# Patient Record
Sex: FEMALE | Race: WHITE | NOT HISPANIC OR LATINO | Employment: PART TIME | URBAN - METROPOLITAN AREA
[De-identification: names, ages, dates, MRNs, and addresses within clinical notes are randomized per-mention and may not be internally consistent; named-entity substitution may affect disease eponyms.]

---

## 2018-08-13 ENCOUNTER — HOSPITAL ENCOUNTER (OUTPATIENT)
Dept: MRI IMAGING | Facility: CLINIC | Age: 38
End: 2018-08-13
Attending: ORTHOPAEDIC SURGERY
Payer: COMMERCIAL

## 2018-08-13 ENCOUNTER — HOSPITAL ENCOUNTER (OUTPATIENT)
Dept: GENERAL RADIOLOGY | Facility: CLINIC | Age: 38
Discharge: HOME | End: 2018-08-13
Attending: ORTHOPAEDIC SURGERY | Admitting: ORTHOPAEDIC SURGERY
Payer: COMMERCIAL

## 2018-08-13 DIAGNOSIS — M25.551 HIP PAIN, RIGHT: ICD-10-CM

## 2018-08-13 PROCEDURE — 25000125 ZZHC RX 250: Performed by: RADIOLOGY

## 2018-08-13 PROCEDURE — 25000128 H RX IP 250 OP 636: Performed by: RADIOLOGY

## 2018-08-13 PROCEDURE — 27210995 ZZH RX 272: Performed by: RADIOLOGY

## 2018-08-13 PROCEDURE — A9579 GAD-BASE MR CONTRAST NOS,1ML: HCPCS | Performed by: RADIOLOGY

## 2018-08-13 PROCEDURE — 73722 MRI JOINT OF LWR EXTR W/DYE: CPT | Mod: RT

## 2018-08-13 PROCEDURE — 25500064 ZZH RX 255 OP 636: Performed by: RADIOLOGY

## 2018-08-13 PROCEDURE — 27093 INJECTION FOR HIP X-RAY: CPT

## 2018-08-13 RX ORDER — TRIAMCINOLONE ACETONIDE 40 MG/ML
80 INJECTION, SUSPENSION INTRA-ARTICULAR; INTRAMUSCULAR ONCE
Status: COMPLETED | OUTPATIENT
Start: 2018-08-13 | End: 2018-08-13

## 2018-08-13 RX ORDER — ACYCLOVIR 200 MG/1
10 CAPSULE ORAL ONCE
Status: COMPLETED | OUTPATIENT
Start: 2018-08-13 | End: 2018-08-13

## 2018-08-13 RX ORDER — IOPAMIDOL 408 MG/ML
10 INJECTION, SOLUTION INTRATHECAL ONCE
Status: COMPLETED | OUTPATIENT
Start: 2018-08-13 | End: 2018-08-13

## 2018-08-13 RX ORDER — EPINEPHRINE 1 MG/ML
1 INJECTION, SOLUTION, CONCENTRATE INTRAVENOUS ONCE
Status: COMPLETED | OUTPATIENT
Start: 2018-08-13 | End: 2018-08-13

## 2018-08-13 RX ADMIN — LIDOCAINE HYDROCHLORIDE 5 ML: 10 INJECTION, SOLUTION EPIDURAL; INFILTRATION; INTRACAUDAL; PERINEURAL at 13:05

## 2018-08-13 RX ADMIN — TRIAMCINOLONE ACETONIDE 80 MG: 40 INJECTION, SUSPENSION INTRA-ARTICULAR; INTRAMUSCULAR at 13:05

## 2018-08-13 RX ADMIN — GADOPENTETATE DIMEGLUMINE 0.05 ML: 469.01 INJECTION INTRAVENOUS at 13:05

## 2018-08-13 RX ADMIN — SODIUM CHLORIDE 10 ML: 9 INJECTION, SOLUTION INTRAMUSCULAR; INTRAVENOUS; SUBCUTANEOUS at 13:05

## 2018-08-13 RX ADMIN — IOPAMIDOL 5 ML: 408 INJECTION, SOLUTION INTRATHECAL at 13:33

## 2018-08-13 RX ADMIN — EPINEPHRINE 0.05 MG: 1 INJECTION, SOLUTION, CONCENTRATE INTRAVENOUS at 13:05

## 2018-08-13 NOTE — PROGRESS NOTES
RADIOLOGY PROCEDURE NOTE  Patient name: Neeru Scanlon  MRN: [id number]  : 1980    Pre-procedure diagnosis: Pain.  Post-procedure diagnosis: Same    Procedure Date/Time: 2018  1:29 PM  Procedure: Right hip intraarticular JAYCOB/steroid injection for MRI to follow.  Estimated blood loss: None  Specimen(s) collected:  None.  The patient tolerated the procedure well with no immediate complications.    See imaging dictation for procedural details.    Provider name: Uriah Heredia  Assistant(s):None

## 2020-01-24 ENCOUNTER — HOSPITAL ENCOUNTER (OUTPATIENT)
Dept: MRI IMAGING | Facility: CLINIC | Age: 40
Discharge: HOME | End: 2020-01-24
Attending: PODIATRIST | Admitting: PODIATRIST
Payer: COMMERCIAL

## 2020-01-24 DIAGNOSIS — M25.373 ANKLE INSTABILITY: ICD-10-CM

## 2020-01-24 PROCEDURE — 73721 MRI JNT OF LWR EXTRE W/O DYE: CPT | Mod: RT

## 2020-07-08 ENCOUNTER — HOSPITAL ENCOUNTER (OUTPATIENT)
Dept: PHYSICAL THERAPY | Facility: CLINIC | Age: 40
Setting detail: THERAPIES SERIES
End: 2020-07-08
Attending: PODIATRIST
Payer: COMMERCIAL

## 2020-07-08 PROCEDURE — 97110 THERAPEUTIC EXERCISES: CPT | Mod: GP | Performed by: PHYSICAL THERAPIST

## 2020-07-08 PROCEDURE — 97530 THERAPEUTIC ACTIVITIES: CPT | Mod: GP | Performed by: PHYSICAL THERAPIST

## 2020-07-08 PROCEDURE — 97162 PT EVAL MOD COMPLEX 30 MIN: CPT | Mod: GP,59 | Performed by: PHYSICAL THERAPIST
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
## 2020-07-08 NOTE — PROGRESS NOTES
[facility]    OUTPATIENT PHYSICAL THERAPY ORTHOPEDIC EVALUATION  PLAN OF TREATMENT FOR OUTPATIENT REHABILITATION  (COMPLETE FOR INITIAL CLAIMS ONLY)  Patient's Last Name, First Name, M.I.  YOB: 1980  CapoNeeru  MATTHEW    Provider s Name:  [facility]   Medical Record No.  [id number]   Start of Care Date:  07/08/20   Onset Date:  05/13/20(Surgical Date. )   Type:     _X__PT   ___OT   ___SLP Medical Diagnosis:  R Post op Tendon repair, ligamentous repair, retinaculum repair      PT Diagnosis:  Limited mobility d/t recent ankle surgery for tendon repair on 5/13/20.    Visits from SOC:  1      _________________________________________________________________________________  Plan of Treatment/Functional Goals:     Desensitization, MT, DEvelop HEP, PTRX# [id number]     US, Iontophoresis per order.   Goals  Goal Identifier: 1  Goal Description: STG: Pt will take stairs normally and minimal difficulty.   Target Date: 08/10/20    Goal Identifier: 2  Goal Description: STG: Pt will be able to walk 2 blocks w/ moderate difficulty.   Target Date: 08/10/20    Goal Identifier: 3  Goal Description: STG: Pt will be able tos it for an hour w/ moderate difficulty.   Target Date: 08/10/20    Goal Identifier: 4  Goal Description: STG: Pt will be floyd to get in/out of car w/ minimal difficulty.   Target Date: 08/24/20    Goal Identifier: 5  Goal Description: LTG: Pt will be independnet w/ HEP and RTW.   Target Date: 10/09/20    Therapy Frequency:  2 times/Week  Predicted Duration of Therapy Intervention:  6 weeks, 12 visits.     Carole Valdez, PT                 I CERTIFY THE NEED FOR THESE SERVICES FURNISHED UNDER        THIS PLAN OF TREATMENT AND WHILE UNDER MY CARE     (Physician co-signature of this document indicates review and certification of the therapy plan).                     Certification Date From:  07/08/20   Certification Date To:  08/24/20    Referring Provider:   Hernan Arnett    Initial Assessment        See Epic Evaluation Start of Care Date: 07/08/20

## 2020-07-10 ENCOUNTER — HOSPITAL ENCOUNTER (OUTPATIENT)
Dept: PHYSICAL THERAPY | Facility: CLINIC | Age: 40
Setting detail: THERAPIES SERIES
End: 2020-07-10
Attending: PODIATRIST
Payer: COMMERCIAL

## 2020-07-10 PROCEDURE — 97140 MANUAL THERAPY 1/> REGIONS: CPT | Mod: GP | Performed by: PHYSICAL THERAPIST

## 2020-07-10 PROCEDURE — 97110 THERAPEUTIC EXERCISES: CPT | Mod: GP | Performed by: PHYSICAL THERAPIST

## 2020-07-15 ENCOUNTER — HOSPITAL ENCOUNTER (OUTPATIENT)
Dept: PHYSICAL THERAPY | Facility: CLINIC | Age: 40
Setting detail: THERAPIES SERIES
End: 2020-07-15
Attending: PODIATRIST
Payer: COMMERCIAL

## 2020-07-15 PROCEDURE — 97140 MANUAL THERAPY 1/> REGIONS: CPT | Mod: GP | Performed by: PHYSICAL THERAPIST

## 2020-07-15 PROCEDURE — 97110 THERAPEUTIC EXERCISES: CPT | Mod: GP | Performed by: PHYSICAL THERAPIST

## 2020-07-20 ENCOUNTER — HOSPITAL ENCOUNTER (OUTPATIENT)
Dept: PHYSICAL THERAPY | Facility: CLINIC | Age: 40
Setting detail: THERAPIES SERIES
End: 2020-07-20
Attending: PODIATRIST
Payer: COMMERCIAL

## 2020-07-20 PROCEDURE — 97110 THERAPEUTIC EXERCISES: CPT | Mod: GP | Performed by: PHYSICAL THERAPIST

## 2020-07-27 ENCOUNTER — HOSPITAL ENCOUNTER (OUTPATIENT)
Dept: PHYSICAL THERAPY | Facility: CLINIC | Age: 40
Setting detail: THERAPIES SERIES
End: 2020-07-27
Attending: PODIATRIST
Payer: COMMERCIAL

## 2020-07-27 PROCEDURE — 97116 GAIT TRAINING THERAPY: CPT | Mod: GP | Performed by: PHYSICAL THERAPIST

## 2020-07-27 PROCEDURE — 97112 NEUROMUSCULAR REEDUCATION: CPT | Mod: GP | Performed by: PHYSICAL THERAPIST

## 2020-07-27 NOTE — PROGRESS NOTES
Neeru Scanlon  1980  Diagnosis - R Post op Tendon repair, ligamentous repair, retinaculum repair Physical Therapy Progress Note  07/27/20 0900   Signing Clinician's Name / Credentials   Signing clinician's name / credentials Amor Ornelas PT, DPT   Session Number   Session Number 5 (Start of Care Date - 7/8/2020)   Authorization status HP MA - Auth'd    Progress Note/Recertification   Progress Note Due Date 08/24/20   Progress Note Completed Date 07/27/20   Recertification Due Date 08/24/20   Adult Goals   PT Ortho Eval Goals 1;2;3;4;5   Ortho Goal 1   Goal Identifier 1   Goal Description STG: Pt will take stairs normally and minimal difficulty.   (Not met. Steps down sideways.)   Target Date 08/10/20   Ortho Goal 2   Goal Identifier 2   Goal Description STG: Pt will be able to walk 2 blocks w/ moderate difficulty.    Target Date 08/10/20   Date Met 07/27/20   Ortho Goal 3   Goal Identifier 3   Goal Description STG: Pt will be able tos it for an hour w/ moderate difficulty.    Target Date 08/10/20   Ortho Goal 4   Goal Identifier 4   Goal Description STG: Pt will be floyd to get in/out of car w/ minimal difficulty.    Target Date 08/24/20   Date Met 07/27/20   Ortho Goal 5   Goal Identifier 5   Goal Description LTG: Pt will be independnet w/ HEP and RTW.   (Not met. Has not returned to work)   Target Date 10/09/20   Subjective Report   Subjective Report 0/10 pain right now. Things have been going pretty well overall. Has swelling at end of day and then will have some soreness. 4-5/10 pain at end of the day.   Objective Measures   Objective Measures Objective Measure 1;Objective Measure 2;Objective Measure 3   Objective Measure 1   Objective Measure AROM   Details DF - 5 / PF - 35 / Inversion - 15 / Eversion - 17   Objective Measure 2   Objective Measure PROM   Details DF - 20   Treatment Interventions   Interventions Therapeutic Activity;Therapeutic Procedure/Exercise;Manual Therapy;Neuromuscular  Re-education;Gait Training   Therapeutic Procedure/exercise   Progress PTRX# [id number]   Neuromuscular Re-education   Neuromuscular re-ed of mvmt, balance, coord, kinesthetic sense, posture, proprioception minutes (68060) 14   Skilled Intervention Balance exercise education   Patient Response More difficulty with tandem stance vs tandem walking   Treatment Detail Tandem stance x4 mins / SLS 2x30 seconds / Tandem walking x6 lengths / BOSU lunges x15 B   Gait Training   Gait Training Minutes, includes stair climbing (79368) 10   Skilled Intervention Gait training education   Patient Response Improved gait mechanics following treatment   Treatment Detail Stair climbing x4 reps on 4 stairs / Gait in gym x8 lengths with emphasis on increased heel strike and toe off to reduce limp   Assessments Completed   Assessments Completed  R Ankle post op    Equipment Needs   Equipment Needs Has trilock brace.    Education   Education Comments  PTRX# [id number]   Communication with other professionals   Communication with other professionals Eval sent to MD, Cert sent to MD.    Plan   Home program  PTRX# [id number]   Updates to plan of care Carmina ERAZO per MD order   Plan for next session Diagonals w/ TBand / SLS / Tandem walking / BOSU lunges   Comments   Comments Pt is doing well overall as her pain has decreased and her ROM has increased. Pt has met 2 of 5 goals and is making progress towards remaining goals. Pt would benefit from continued skilled physical therapy in order to build on these gains and meet remaining goals, including returning to work.    Total Session Time   Timed Code Treatment Minutes 24   Total Treatment Time (sum of timed and untimed services) 24   AMBULATORY CLINICS ONLY-MEDICAL AND TREATMENT DIAGNOSIS   PT Diagnosis Limited mobility d/t recent ankle surgery for tendon repair on 5/13/20.      Referring Physician - Hernan Arnett DPM

## 2020-07-29 ENCOUNTER — HOSPITAL ENCOUNTER (OUTPATIENT)
Dept: PHYSICAL THERAPY | Facility: CLINIC | Age: 40
Setting detail: THERAPIES SERIES
End: 2020-07-29
Attending: PODIATRIST
Payer: COMMERCIAL

## 2020-07-29 PROCEDURE — 97110 THERAPEUTIC EXERCISES: CPT | Mod: GP | Performed by: PHYSICAL THERAPIST

## 2020-07-29 PROCEDURE — 97112 NEUROMUSCULAR REEDUCATION: CPT | Mod: GP | Performed by: PHYSICAL THERAPIST

## 2020-07-29 NOTE — PROGRESS NOTES
Neeru Scanlon  1980  Diagnosis - R Post op Tendon repair, ligamentous repair, retinaculum repair Physical Therapy Initial Evaluation  07/29/20 0900   Signing Clinician's Name / Credentials   Signing clinician's name / credentials Amor Ornelas PT, DPT   Session Number   Session Number 6 (Start of Care Date - 7/8/2020)   Authorization status HP MA - Auth'd    Progress Note/Recertification   Progress Note Due Date 08/24/20   Progress Note Completed Date 07/29/20   Recertification Due Date 08/24/20   Adult Goals   PT Ortho Eval Goals 1;2;3;4;5   Ortho Goal 1   Goal Identifier 1   Goal Description STG: Pt will take stairs normally and minimal difficulty.    Target Date 08/10/20   Date Met 07/29/20   Ortho Goal 2   Goal Identifier 2   Goal Description STG: Pt will be able to walk 2 blocks w/ moderate difficulty.    Target Date 08/10/20   Date Met 07/27/20   Ortho Goal 3   Goal Identifier 3   Goal Description STG: Pt will be able tos it for an hour w/ moderate difficulty.    Target Date 08/10/20   Date Met 07/29/20   Ortho Goal 4   Goal Identifier 4   Goal Description STG: Pt will be floyd to get in/out of car w/ minimal difficulty.    Target Date 08/24/20   Date Met 07/27/20   Ortho Goal 5   Goal Identifier 5   Goal Description LTG: Pt will be independnet w/ HEP and RTW.   (Partially met. Ind in HEP. Work has not started yet.)   Target Date 10/09/20   Subjective Report   Subjective Report Swelling hasn't been too bad at the end of the day. 0/10 pain currently. Saw Dr. Arnett on Monday and was told that everything looked good.    Objective Measures   Objective Measures Objective Measure 1;Objective Measure 2;Objective Measure 3;Objective Measure 4   Objective Measure 1   Objective Measure AROM   Details DF - 5 / PF - 35 / Inversion - 15 / Eversion - 17   Objective Measure 2   Objective Measure PROM   Details DF - 20   Objective Measure 3   Objective Measure AROM uninvolved side   Details DF - 7 / PF - 40 / Inversion  - 20 / Eversion - 23   Objective Measure 4   Objective Measure Gait   Details Ascends and descends stairs with reciprical gait pattern safely. Ambulates in gym with  good heel strike and toe off.   Treatment Interventions   Interventions Therapeutic Activity;Therapeutic Procedure/Exercise;Manual Therapy;Neuromuscular Re-education;Gait Training   Therapeutic Procedure/exercise   Therapeutic Procedures: strength, endurance, ROM, flexibillity minutes (09590) 12   Skilled Intervention Strengthening exercise education   Patient Response Cueing to perform heel raises in pain free range   Treatment Detail Ankle 4-way x15 in each direction with GTB / Heel raises x12 (pain with full DF - performed in pain free range / Mini lunges x15 B    Progress PTRX# [id number]   Neuromuscular Re-education   Neuromuscular re-ed of mvmt, balance, coord, kinesthetic sense, posture, proprioception minutes (70496) 13   Skilled Intervention Balance exercise education   Patient Response Performed well with improved balance at today's visit   Treatment Detail Tandem stance x4 mins / SLS 2x30 and x60 seconds / Tandem walking x4 lengths   Assessments Completed   Assessments Completed  R Ankle post op    Equipment Needs   Equipment Needs Has trilock brace.    Education   Education Comments  PTRX# [id number]   Communication with other professionals   Communication with other professionals Eval sent to MD, Cert sent to MD.    Plan   Home program  PTRX# [id number]   Updates to plan of care DC   Comments   Comments Pt has done well in physical therapy and has met 4 of 5 goals. Pt has not met remaining goal because she is a  and school has not started at this time. Pt's gait for stair ascent and descent was much improved compared to previous visit and pt was no longer walking down with foot sideways. Pt stated that she had no concerns for performance of daily activities at this time and would like to be discharged to [facility] at this time.     Total Session Time   Timed Code Treatment Minutes 25   Total Treatment Time (sum of timed and untimed services) 25   AMBULATORY CLINICS ONLY-MEDICAL AND TREATMENT DIAGNOSIS   PT Diagnosis Limited mobility d/t recent ankle surgery for tendon repair on 5/13/20.      Referring Physician - Hernan Arnett

## 2020-10-28 ENCOUNTER — HOSPITAL ENCOUNTER (EMERGENCY)
Facility: CLINIC | Age: 40
Discharge: HOME | End: 2020-10-28
Attending: PHYSICIAN ASSISTANT | Admitting: PHYSICIAN ASSISTANT
Payer: COMMERCIAL

## 2020-10-28 ENCOUNTER — APPOINTMENT (OUTPATIENT)
Dept: GENERAL RADIOLOGY | Facility: CLINIC | Age: 40
End: 2020-10-28
Attending: PHYSICIAN ASSISTANT
Payer: COMMERCIAL

## 2020-10-28 VITALS
WEIGHT: 185 LBS | OXYGEN SATURATION: 99 % | HEART RATE: 91 BPM | BODY MASS INDEX: 32.77 KG/M2 | DIASTOLIC BLOOD PRESSURE: 89 MMHG | TEMPERATURE: 98.5 F | RESPIRATION RATE: 18 BRPM | SYSTOLIC BLOOD PRESSURE: 151 MMHG

## 2020-10-28 DIAGNOSIS — R19.7 DIARRHEA: ICD-10-CM

## 2020-10-28 DIAGNOSIS — Z20.822 ENCOUNTER FOR LABORATORY TESTING FOR COVID-19 VIRUS: ICD-10-CM

## 2020-10-28 DIAGNOSIS — J98.9 RESPIRATORY ILLNESS: ICD-10-CM

## 2020-10-28 PROCEDURE — 71045 X-RAY EXAM CHEST 1 VIEW: CPT

## 2020-10-28 PROCEDURE — 99203 OFFICE O/P NEW LOW 30 MIN: CPT | Performed by: PHYSICIAN ASSISTANT

## 2020-10-28 PROCEDURE — G0463 HOSPITAL OUTPT CLINIC VISIT: HCPCS | Mod: 25 | Performed by: PHYSICIAN ASSISTANT

## 2020-10-28 PROCEDURE — C9803 HOPD COVID-19 SPEC COLLECT: HCPCS | Performed by: PHYSICIAN ASSISTANT

## 2020-10-28 PROCEDURE — U0003 INFECTIOUS AGENT DETECTION BY NUCLEIC ACID (DNA OR RNA); SEVERE ACUTE RESPIRATORY SYNDROME CORONAVIRUS 2 (SARS-COV-2) (CORONAVIRUS DISEASE [COVID-19]), AMPLIFIED PROBE TECHNIQUE, MAKING USE OF HIGH THROUGHPUT TECHNOLOGIES AS DESCRIBED BY CMS-2020-01-R: HCPCS | Performed by: PHYSICIAN ASSISTANT

## 2020-10-28 RX ORDER — ALBUTEROL SULFATE 90 UG/1
2 AEROSOL, METERED RESPIRATORY (INHALATION) EVERY 6 HOURS PRN
Qty: 1 INHALER | Refills: 0 | Status: SHIPPED | OUTPATIENT
Start: 2020-10-28 | End: 2020-10-28

## 2020-10-28 RX ORDER — ALBUTEROL SULFATE 90 UG/1
2 AEROSOL, METERED RESPIRATORY (INHALATION) EVERY 4 HOURS PRN
Qty: 1 INHALER | Refills: 0 | Status: SHIPPED | OUTPATIENT
Start: 2020-10-28

## 2020-10-28 NOTE — ED PROVIDER NOTES
History     Chief Complaint   Patient presents with     Fever     started yesterday     Generalized Body Aches     HPI  Neeru Scanlon is a 40 year old female who presents with complaints of low-grade temps since yesterday.  Patient also complains of generalized body aches, fatigue, diarrhea, cough, chest tightness, and shortness of breath since yesterday.  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  She also reports that she has had an ongoing migraine headache over the past 2 weeks.  She follows with neurology and was seen in the emergency department a couple days ago and received several medications with minimal improvement.  Denies nausea, vomiting, abdominal pain, or urinary symptoms.  Patient denies history of asthma or underlying lung disease.      Allergies:  Allergies   Allergen Reactions     Nkda [No Known Drug Allergies]        Problem List:    Patient Active Problem List    Diagnosis Date Noted     Depression, major 09/05/2012     Priority: Medium        Past Medical History:    No past medical history on file.    Past Surgical History:    No past surgical history on file.    Family History:    No family history on file.    Social History:  Marital Status:   [2]  Social History     Tobacco Use     Smoking status: Not on file   Substance Use Topics     Alcohol use: Not on file     Drug use: Not on file        Medications:         albuterol (PROAIR HFA/PROVENTIL HFA/VENTOLIN HFA) 108 (90 Base) MCG/ACT inhaler       OMEPRAZOLE PO          Review of Systems   Constitutional: Positive for fatigue and fever.   Respiratory: Positive for cough, chest tightness and shortness of breath.    Gastrointestinal: Positive for diarrhea.   Musculoskeletal: Positive for arthralgias.   Neurological: Positive for headaches.   All other systems reviewed and are negative.      Physical Exam   BP: (!) 151/89  Pulse: 91  Temp: 98.5  F (36.9  C)  Resp: 18  Weight: 83.9 kg  (185 lb)  SpO2: 99 %      Physical Exam  Constitutional:       General: She is not in acute distress.     Appearance: Normal appearance. She is well-developed. She is not ill-appearing, toxic-appearing or diaphoretic.   HENT:      Head: Normocephalic and atraumatic.      Mouth/Throat:      Mouth: Mucous membranes are moist.      Pharynx: Oropharynx is clear. Uvula midline. No pharyngeal swelling, oropharyngeal exudate, posterior oropharyngeal erythema or uvula swelling.      Tonsils: No tonsillar exudate or tonsillar abscesses.   Eyes:      Conjunctiva/sclera: Conjunctivae normal.      Pupils: Pupils are equal, round, and reactive to light.   Neck:      Musculoskeletal: Full passive range of motion without pain, normal range of motion and neck supple. No neck rigidity.      Meningeal: Brudzinski's sign and Kernig's sign absent.   Cardiovascular:      Rate and Rhythm: Normal rate and regular rhythm.      Heart sounds: Normal heart sounds.   Pulmonary:      Effort: Pulmonary effort is normal. No respiratory distress.      Breath sounds: Normal breath sounds and air entry. No stridor, decreased air movement or transmitted upper airway sounds. No decreased breath sounds, wheezing, rhonchi or rales.   Lymphadenopathy:      Cervical: No cervical adenopathy.   Skin:     General: Skin is warm and dry.   Neurological:      Mental Status: She is alert and oriented to person, place, and time.   Psychiatric:         Behavior: Behavior is cooperative.         ED Course        Procedures    No results found for this or any previous visit (from the past 24 hour(s)).     Results for orders placed or performed during the hospital encounter of 10/28/20   XR Chest Port 1 View     Status: None    Narrative    CHEST PORTABLE ONE VIEW   10/28/2020 5:50 PM     HISTORY: Cough, chest tightness, shortness of breath.    COMPARISON: None available.      Impression    IMPRESSION: Single AP view of the chest was obtained.  Cardiomediastinal  silhouette is within normal limits. No suspicious  focal pulmonary opacities. No significant pleural effusion or  pneumothorax.    RUSSELL MEDINA MD   Symptomatic COVID-19 Virus (Coronavirus) by PCR     Status: Abnormal    Specimen: Nasopharyngeal   Result Value Ref Range    COVID-19 Virus PCR to [facility] - Source Nasopharyngeal     COVID-19 Virus PCR to [facility] - Result Detected, Abnormal Result (AA)        Medications - No data to display    Assessments & Plan (with Medical Decision Making)     Pt is a 40 year old female who presents with complaints of low-grade temps since yesterday.  Patient also complains of generalized body aches, fatigue, diarrhea, cough, chest tightness, and shortness of breath since yesterday.  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  She denies any specific known COVID-19 contact.  She also reports that she has had an ongoing migraine headache over the past 2 weeks.      Pt is afebrile on arrival.  Exam as above.  Pt is not hypoxic.  She is in no respiratory distress.  CXR was negative for acute pathology.  Covid-19 testing was obtained and is currently pending.  Discussed results with patient.  Encouraged symptomatic treatments at home.  Return precautions were reviewed.  Hand-outs were provided.    Patient wwas instructed to follow-up with PCP if no improvement in 3-5 days for continued care and management or sooner if new or worsening symptoms.  She is to return to the ED for persistent and/or worsening symptoms.  Patient expressed understanding of the diagnosis and plan and was discharged home in good condition.    I have reviewed the nursing notes.    I have reviewed the findings, diagnosis, plan and need for follow up with the patient.    Discharge Medication List as of 10/28/2020  6:22 PM      START taking these medications    Details   albuterol (PROAIR HFA/PROVENTIL HFA/VENTOLIN HFA) 108 (90 Base) MCG/ACT inhaler Inhale 2 puffs  into the lungs every 6 hours as needed for shortness of breath / dyspnea or wheezing, Disp-1 Inhaler, R-0, E-PrescribePharmacy may dispense brand covered by insurance (Proair, or proventil or ventolin or generic albuterol inhaler)             Final diagnoses:   Respiratory illness   Encounter for laboratory testing for COVID-19 virus   Diarrhea       10/28/2020   [facility] EMERGENCY DEPT      Disclaimer:  This note consists of symbols derived from keyboarding, dictation and/or voice recognition software.  As a result, there may be errors in the script that have gone undetected.  Please consider this when interpreting information found in this chart.     Ruth Arora PA-C  10/29/20 [id number]

## 2020-10-28 NOTE — ED AVS SNAPSHOT
[facility] Emergency Dept  [address] [address]  Phone: [phone]  Fax: [phone]                                    Neeru Scanlon   MRN: [id number]    Department: [facility] Emergency Dept   Date of Visit: 10/28/2020           After Visit Summary Signature Page    I have received my discharge instructions, and my questions have been answered. I have discussed any challenges I see with this plan with the nurse or doctor.    ..........................................................................................................................................  Patient/Patient Representative Signature      ..........................................................................................................................................  Patient Representative Print Name and Relationship to Patient    ..................................................               ................................................  Date                                   Time    ..........................................................................................................................................  Reviewed by Signature/Title    ...................................................              ..............................................  Date                                               Time          22EPIC Rev 08/18

## 2020-10-29 LAB
SARS-COV-2 RNA SPEC QL NAA+PROBE: ABNORMAL
SPECIMEN SOURCE: ABNORMAL

## 2020-10-29 ASSESSMENT — ENCOUNTER SYMPTOMS
FEVER: 1
DIARRHEA: 1
COUGH: 1
FATIGUE: 1
CHEST TIGHTNESS: 1
SHORTNESS OF BREATH: 1
ARTHRALGIAS: 1
HEADACHES: 1

## 2020-12-06 ENCOUNTER — HEALTH MAINTENANCE LETTER (OUTPATIENT)
Age: 40
End: 2020-12-06

## 2021-09-25 ENCOUNTER — HEALTH MAINTENANCE LETTER (OUTPATIENT)
Age: 41
End: 2021-09-25

## 2022-01-15 ENCOUNTER — HEALTH MAINTENANCE LETTER (OUTPATIENT)
Age: 42
End: 2022-01-15

## 2022-08-19 ENCOUNTER — TRANSCRIBE ORDERS (OUTPATIENT)
Dept: OTHER | Age: 42
End: 2022-08-19

## 2022-08-19 DIAGNOSIS — S39.012A LUMBAR STRAIN: Primary | ICD-10-CM

## 2022-08-25 ENCOUNTER — THERAPY VISIT (OUTPATIENT)
Dept: PHYSICAL THERAPY | Facility: CLINIC | Age: 42
End: 2022-08-25
Attending: PREVENTIVE MEDICINE
Payer: OTHER MISCELLANEOUS

## 2022-08-25 DIAGNOSIS — M54.50 BILATERAL LOW BACK PAIN WITHOUT SCIATICA: ICD-10-CM

## 2022-08-25 PROCEDURE — 97110 THERAPEUTIC EXERCISES: CPT | Mod: GP | Performed by: PHYSICAL THERAPIST

## 2022-08-25 PROCEDURE — 97161 PT EVAL LOW COMPLEX 20 MIN: CPT | Mod: GP | Performed by: PHYSICAL THERAPIST

## 2022-08-25 NOTE — PROGRESS NOTES
Physical Therapy Initial Evaluation  Subjective:  The history is provided by the patient. No  was used.   Patient Health History  Neeru Scanlon being seen for Lumbar strain.     Problem began: 8/1/2022.   Problem occurred: Work injury   Pain is reported as 5/10 on pain scale.  General health as reported by patient is good.  Pertinent medical history includes: depression, migraines/headaches and thyroid problems.   Red flags:  None as reported by patient.  Medical allergies: none.   Surgeries include:  Orthopedic surgery.    Current medications:  Thyroid medication and other. Other medications details: Migraine.    Current occupation is .   Primary job tasks include:  Driving, lifting/carrying and pushing/pulling.                  Therapist Generated HPI Evaluation  Problem details: Works as , using pallet jake to move pallet off of truck, notes that a pallet didn't turn well to bring off truck so had to pull to reposition it and had onset of back pain which continued to worsen throughout the day.   Wakes up very stiff in mornings.         Type of problem:  Lumbar.    This is a new condition.  Condition occurred with:  Lifting.  Where condition occurred: at work.  Patient reports pain:  Central lumbar spine (tightness B low back, and through paraspinals into upper back and neck).  Pain is described as aching and sharp and is constant.  Pain radiates to:  No radiation. Pain is the same all the time.  Since onset symptoms are gradually improving.  Symptoms are exacerbated by lifting and bending (any activity for prolonged period of time)  and relieved by NSAID's, ice and heat.      Restrictions due to condition include:  Working in normal job with restrictions.                          Objective:  System         Lumbar/SI Evaluation  ROM:    AROM Lumbar:   Flexion:          To mid shin, L sided low back tightness  Ext:                    Min motion past neutral, noting tightness / pain    Side Bend:        Left:  Mod restriction, w/ L sided lbp    Right:  Min restriction  Rotation:           Left:     Right:   Side Glide:        Left:     Right:           Lumbar Myotomes:  normal                Lumbar Dermtomes:  normal                  Lumbar Palpation:  Palpation (lumbar): tenderness paraspinals mid thoracic through lumbar spine.  Tenderness present at Left:    Quadratus Lumborum; Erector Spinae and PSIS  Tenderness present at Right: Quadratus Lumborum; Erector Spinae and PSIS    Lumbar Provocation:      Left negative with:  PROM hip    Right negative with:  PROM hip                                                 General     ROS    Assessment/Plan:    Patient is a 42 year old female with lumbar complaints.    Patient has the following significant findings with corresponding treatment plan.                Diagnosis 1:  Low back pain  Pain -  home program  Decreased ROM/flexibility - manual therapy and therapeutic exercise  Decreased joint mobility - manual therapy and therapeutic exercise  Decreased strength - therapeutic exercise and therapeutic activities  Impaired muscle performance - neuro re-education    Therapy Evaluation Codes:   1) History comprised of:   Personal factors that impact the plan of care:      Work status.    Comorbidity factors that impact the plan of care are:      Depression and Migraines/headaches.     Medications impacting care: None.  2) Examination of Body Systems comprised of:   Body structures and functions that impact the plan of care:      Lumbar spine and Thoracic Spine.   Activity limitations that impact the plan of care are:      Bending, Driving, Dressing, Lifting, Sitting, Standing, Walking and Working.  3) Clinical presentation characteristics are:   Stable/Uncomplicated.  4) Decision-Making    Low complexity using standardized patient assessment instrument and/or measureable assessment of functional outcome.  Cumulative Therapy Evaluation is: Low  complexity.    Previous and current functional limitations:  (See Goal Flow Sheet for this information)    Short term and Long term goals: (See Goal Flow Sheet for this information)     Communication ability:  Patient appears to be able to clearly communicate and understand verbal and written communication and follow directions correctly.  Treatment Explanation - The following has been discussed with the patient:   RX ordered/plan of care  Anticipated outcomes  Possible risks and side effects  This patient would benefit from PT intervention to resume normal activities.   Rehab potential is excellent.    Frequency:  1 X week, once daily  Duration:  for 6 weeks  Discharge Plan:  Achieve all LTG.  Independent in home treatment program.    Please refer to the daily flowsheet for treatment today, total treatment time and time spent performing 1:1 timed codes.

## 2022-09-07 ENCOUNTER — THERAPY VISIT (OUTPATIENT)
Dept: PHYSICAL THERAPY | Facility: CLINIC | Age: 42
End: 2022-09-07
Payer: OTHER MISCELLANEOUS

## 2022-09-07 DIAGNOSIS — M54.50 BILATERAL LOW BACK PAIN WITHOUT SCIATICA: Primary | ICD-10-CM

## 2022-09-07 PROCEDURE — 97530 THERAPEUTIC ACTIVITIES: CPT | Mod: GP | Performed by: PHYSICAL THERAPIST

## 2022-09-07 PROCEDURE — 97110 THERAPEUTIC EXERCISES: CPT | Mod: 59 | Performed by: PHYSICAL THERAPIST

## 2022-09-14 ENCOUNTER — THERAPY VISIT (OUTPATIENT)
Dept: PHYSICAL THERAPY | Facility: CLINIC | Age: 42
End: 2022-09-14
Payer: OTHER MISCELLANEOUS

## 2022-09-14 DIAGNOSIS — M54.50 BILATERAL LOW BACK PAIN WITHOUT SCIATICA: Primary | ICD-10-CM

## 2022-09-14 PROCEDURE — 97530 THERAPEUTIC ACTIVITIES: CPT | Mod: GP | Performed by: PHYSICAL THERAPIST

## 2022-09-14 PROCEDURE — 97110 THERAPEUTIC EXERCISES: CPT | Mod: 59 | Performed by: PHYSICAL THERAPIST

## 2022-09-14 NOTE — PROGRESS NOTES
DISCHARGE REPORT    Progress reporting period is from 8/25/22 to 9/14/22.       SUBJECTIVE   Subjective: Back continues to improve - minimal to no pain. Feels 100% improved and reports she feels she would be able to complete her job.      Current pain level is 0/10  .     Initial Pain level: 5/10.   Changes in function:  Yes (See Goal flowsheet attached for changes in current functional level)  Adverse reaction to treatment or activity: None    OBJECTIVE  Changes noted in objective findings:  Yes, Lumbar ROM: full and painfree ( flexion fingertips to floor. SB B to knee joint line). Good lifting mechanics demonstrated from floor to counter and ambulation w/ weighted crate. Good tolerance to strengthening exercises, able to progress at each session.        ASSESSMENT/PLAN  Updated problem list and treatment plan: Diagnosis 1:  Low back pain  Decreased strength - home program  Impaired muscle performance - home program  STG/LTGs have been met or progress has been made towards goals:  Yes (See Goal flow sheet completed today.)  Assessment of Progress: The patient's condition is improving.  The patient has met all of their long term goals.  Self Management Plans:  Patient is independent in a home treatment program.  Patient is independent in self management of symptoms.  I have re-evaluated this patient and find that the nature, scope, duration and intensity of the therapy is appropriate for the medical condition of the patient.  Neeru continues to require the following intervention to meet STG and LTG's:  PT intervention is no longer required to meet STG/LTG.    Recommendations:  This patient is ready to be discharged from therapy and continue their home treatment program.    Please refer to the daily flowsheet for treatment today, total treatment time and time spent performing 1:1 timed codes.

## 2023-04-22 ENCOUNTER — HEALTH MAINTENANCE LETTER (OUTPATIENT)
Age: 43
End: 2023-04-22

## 2023-09-19 ENCOUNTER — TRANSCRIBE ORDERS (OUTPATIENT)
Dept: URGENT CARE | Facility: CLINIC | Age: 43
End: 2023-09-19
Payer: COMMERCIAL

## 2023-09-19 DIAGNOSIS — S39.012A STRAIN OF LUMBAR REGION: ICD-10-CM

## 2023-09-19 DIAGNOSIS — S20.212A RIB CONTUSION, LEFT, INITIAL ENCOUNTER: Primary | ICD-10-CM

## 2023-10-02 ENCOUNTER — THERAPY VISIT (OUTPATIENT)
Dept: PHYSICAL THERAPY | Facility: CLINIC | Age: 43
End: 2023-10-02
Payer: OTHER MISCELLANEOUS

## 2023-10-02 DIAGNOSIS — M54.6 LEFT-SIDED THORACIC BACK PAIN: Primary | ICD-10-CM

## 2023-10-02 DIAGNOSIS — S20.212A RIB CONTUSION, LEFT, INITIAL ENCOUNTER: ICD-10-CM

## 2023-10-02 DIAGNOSIS — S39.012A STRAIN OF LUMBAR REGION: ICD-10-CM

## 2023-10-02 PROCEDURE — 97140 MANUAL THERAPY 1/> REGIONS: CPT | Mod: GP | Performed by: PHYSICAL THERAPIST

## 2023-10-02 PROCEDURE — 97162 PT EVAL MOD COMPLEX 30 MIN: CPT | Mod: GP | Performed by: PHYSICAL THERAPIST

## 2023-10-02 PROCEDURE — 97110 THERAPEUTIC EXERCISES: CPT | Mod: GP | Performed by: PHYSICAL THERAPIST

## 2023-10-02 NOTE — PROGRESS NOTES
PHYSICAL THERAPY EVALUATION  Type of Visit: Evaluation    See electronic medical record for Abuse and Falls Screening details.    Subjective       Presenting condition or subjective complaint: I pinned myself in the back of my truck with a 1600 lb pallet and hit my ribs/chest and back. Weight of pallet was on left upper chest.     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    Pain assessment: Pain present in left anterior chest in pectoral area and radiates to back in mid thoracic spine down to low back .  Her upper trap is painful as well.    Changing positions at night wakes her.   She has been working on restrictions in [employer].  Has 10 lb restriction, with no bending below knee.     Objective   THORACIC SPINE EVALUATION  PAIN: Pain Location: thoracic spine, lumbar spine, and shoulder  Pain is Exacerbated By: lifting heavier than 10 lbs, pushing, pulling, weightbearing through arm, reaching certain directions, laying down  INTEGUMENTARY (edema, incisions):   POSTURE:   GAIT:   Weightbearing Status:   Assistive Device(s):   Gait Deviations:   BALANCE/PROPRIOCEPTION:   WEIGHTBEARING ALIGNMENT:   ROM:  left shoulder ER limited to 60 deg left compared to 90 deg right , limited flexibility of left pec limiting reaching overhead or out to side.  Thoracic extension WNL but painful in left anterior chest  and posterior mid thoracic area.     MYOTOMES:   DTR S:   CORD SIGNS:   DERMATOMES:   NEURAL TENSION:   FLEXIBILITY:    SPECIAL TESTS:   PALPATION:  Tenderness present left pec major, minor, intercostals from left upper to lower thoracic anterior to posterior  SPINAL SEGMENTAL CONCLUSIONS:       Assessment & Plan   CLINICAL IMPRESSIONS  Medical Diagnosis: rib/low back contusion    Treatment Diagnosis: Thoracic pain   Impression/Assessment: Patient is a 43 year old female with thoracic pain complaints.  The following significant findings have been identified: Pain, Decreased ROM/flexibility, Decreased strength, Impaired muscle performance, and Decreased activity tolerance. These impairments interfere with their ability to perform self care tasks, work tasks, household chores, and household mobility as compared to previous level of function.     Clinical Decision Making (Complexity):  Clinical Presentation: Evolving/Changing  Clinical Presentation Rationale: based on medical and personal factors listed in PT evaluation  Clinical Decision Making (Complexity): Moderate complexity    PLAN OF CARE  Treatment Interventions:  Interventions: Manual Therapy, Neuromuscular Re-education, Therapeutic Exercise    Long Term Goals     PT Goal 1  Goal Identifier: reaching  Goal Description: Neeru will be able to reach with left arm unrestricted by thoracic pain  Rationale: to maximize safety and independence with performance of ADLs and functional tasks;to maximize safety and independence with self cares  Target Date: 11/13/23      Frequency of Treatment: 1-2 x per week  Duration of Treatment: 6 visits    Recommended Referrals to Other Professionals:   Education Assessment:        Risks and benefits of evaluation/treatment have been explained.   Patient/Family/caregiver agrees with Plan of Care.     Evaluation Time:     PT Eval, Moderate Complexity Minutes (83280): 15       Signing Clinician: Josefina Reynaga, PT

## 2024-02-10 ENCOUNTER — HEALTH MAINTENANCE LETTER (OUTPATIENT)
Age: 44
End: 2024-02-10

## 2024-06-29 ENCOUNTER — HEALTH MAINTENANCE LETTER (OUTPATIENT)
Age: 44
End: 2024-06-29

## 2025-07-13 ENCOUNTER — HEALTH MAINTENANCE LETTER (OUTPATIENT)
Age: 45
End: 2025-07-13

## (undated) RX ORDER — TRIAMCINOLONE ACETONIDE 40 MG/ML
INJECTION, SUSPENSION INTRA-ARTICULAR; INTRAMUSCULAR
Status: DISPENSED
Start: 2018-08-13

## (undated) RX ORDER — LIDOCAINE HYDROCHLORIDE 10 MG/ML
INJECTION, SOLUTION EPIDURAL; INFILTRATION; INTRACAUDAL; PERINEURAL
Status: DISPENSED
Start: 2018-08-13

## (undated) RX ORDER — EPINEPHRINE 1 MG/ML
INJECTION, SOLUTION, CONCENTRATE INTRAVENOUS
Status: DISPENSED
Start: 2018-08-13